# Patient Record
Sex: MALE | Race: ASIAN | Employment: PART TIME | URBAN - METROPOLITAN AREA
[De-identification: names, ages, dates, MRNs, and addresses within clinical notes are randomized per-mention and may not be internally consistent; named-entity substitution may affect disease eponyms.]

---

## 2021-07-12 ENCOUNTER — HOSPITAL ENCOUNTER (OUTPATIENT)
Dept: LAB | Age: 71
Discharge: HOME | End: 2021-07-12
Payer: MEDICARE

## 2021-07-12 DIAGNOSIS — R31.0 GROSS HEMATURIA: ICD-10-CM

## 2021-07-12 LAB
APPEARANCE UR: CLEAR
BILIRUB UR QL: NEGATIVE
COLOR UR: YELLOW
GLUCOSE UR STRIP.AUTO-MCNC: NEGATIVE MG/DL
HGB UR QL STRIP: NEGATIVE
KETONES UR QL STRIP.AUTO: NEGATIVE MG/DL
LEUKOCYTE ESTERASE UR QL STRIP.AUTO: NEGATIVE
NITRITE UR QL STRIP.AUTO: NEGATIVE
PH UR STRIP: 6.5 [PH] (ref 5–9)
PROT UR STRIP-MCNC: NEGATIVE MG/DL
SP GR UR REFRACTOMETRY: 1.02 (ref 1–1.02)
UROBILINOGEN UR QL STRIP.AUTO: 0.2 EU/DL (ref 0.2–1)

## 2021-07-12 PROCEDURE — 81003 URINALYSIS AUTO W/O SCOPE: CPT

## 2021-07-12 PROCEDURE — 88112 CYTOPATH CELL ENHANCE TECH: CPT

## 2021-07-13 NOTE — PROGRESS NOTES
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 There was some inflammation present. We will keep the follow-up plan as we discussed yesterday.  Thx, will

## 2021-07-14 LAB
NON-GYNECOLOGIC CYTOLOGY REPRT: NORMAL
SPECIMEN SOURCE: NORMAL

## 2022-01-31 ENCOUNTER — HOSPITAL ENCOUNTER (OUTPATIENT)
Dept: LAB | Age: 72
Discharge: HOME | End: 2022-01-31
Payer: MEDICARE

## 2022-01-31 DIAGNOSIS — R31.0 GROSS HEMATURIA: ICD-10-CM

## 2022-01-31 PROCEDURE — 88300 SURGICAL PATH GROSS: CPT

## 2022-01-31 PROCEDURE — 82355 CALCULUS ANALYSIS QUAL: CPT

## 2022-01-31 PROCEDURE — 81003 URINALYSIS AUTO W/O SCOPE: CPT

## 2022-08-01 ENCOUNTER — TELEPHONE (OUTPATIENT)
Dept: ONCOLOGY | Age: 72
End: 2022-08-01

## 2022-08-01 NOTE — TELEPHONE ENCOUNTER
Pt calling to find out if his CT is supposed to be with contrast. It is today at 1:30. He received call to arrive 1.5 hours early but thought it wasn't ordered this way. Please call.

## 2022-08-01 NOTE — TELEPHONE ENCOUNTER
Verified with CT tech that pt will not receive IV or oral contrast for scan today and that pt does not need to arrive until ~1:15 for this scan. Pt notified.

## 2022-08-05 ENCOUNTER — TELEPHONE (OUTPATIENT)
Dept: ONCOLOGY | Age: 72
End: 2022-08-05

## 2022-08-09 ENCOUNTER — HOSPITAL ENCOUNTER (OUTPATIENT)
Dept: CT IMAGING | Age: 72
Discharge: HOME | End: 2022-08-12

## 2022-08-09 DIAGNOSIS — R31.0 GROSS HEMATURIA: ICD-10-CM

## 2022-08-11 DIAGNOSIS — Z12.5 SCREENING PSA (PROSTATE SPECIFIC ANTIGEN): Primary | ICD-10-CM

## 2022-08-11 NOTE — PROGRESS NOTES
[address] [facility]ematology [facility] On[facility]  [address] [place] [address]  [phone]        Mr. Israel Roy is a 73 y.o. male with a h/o of gross hematuria, stones and BPH. 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 I initially met him when he was having episodes of gross hematuria. He has had no gross hematuria since I last saw him. In fact his urinalysis today is completely negative. I last saw him he was complaining of some lower urinary tract symptoms and mild ED. We discussed him taking 5 mg of Cialis daily. He filled the prescription but has not taken it consistently. He states his symptoms are not bothersome enough at this time to warrant treating them. His PSA was checked today and is 0.8. He has a history of a small nodular area on the right portion of his prostate that has been palpated previously. He passed a stone in October 2021. He had a CT of his abdomen and pelvis without contrast on 8/9/2022. It shows a punctate calcification intrarenally on the left side. There is no evidence of hydronephrosis. He also has what appears to be a simple cyst in the right kidney. 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 He has not had any episodes of gross hematuria since I last saw him. He had a urinalysis today which is negative for microscopic blood. He had his PSA checked by his primary care doctor on 1/5/2022. It was 0.63. He does complain of some increased lower urinary tract symptoms. He has occasional urgency and some post void dribbling. He has to strain to void a little bit. He describes the symptoms as slightly worse than previously. He also complains of some mild erectile dysfunction.   He states ALLERGIES:    No Known Allergies    ROS:     Review of Systems   Constitutional: Negative. Negative for chills, fatigue and fever. Respiratory: Negative. Cardiovascular: Negative. Gastrointestinal: Negative. Genitourinary: Negative. Negative for difficulty urinating, dysuria, flank pain, frequency, hematuria and urgency. Musculoskeletal: Negative. All other systems reviewed and are negative. PHYSICAL EXAMINATION    /70 (Site: Right Upper Arm, Position: Sitting, Cuff Size: Medium Adult)   Pulse 57   Temp 97.8 °F (36.6 °C) (Oral)   Resp 21   Ht 5' 3\" (1.6 m)   Wt 123 lb (55.8 kg)   SpO2 99%   BMI 21.79 kg/m²   General: well dressed, well nourished, no acute distress  Skin: no rashes  HEENT: Sclera are clear,normocephalic, atraumatic. no external lesions   Cardiovascular: Reg. Normal perfusion  Respiratory: normal respiratory effort, no JVD, no audible wheezing. Musculoskeletal: unremarkable with normal function. No embolic signs or cyanosis. Neurologic exam: intact, no focal deficits, moves all 4 extremities  Psych: normal mood and affect, alert, oriented x 3  LE:  no edema  GI: soft, nontender, no masses, no CVA tenderness  : Normal-appearing external genitalia. No penile lesions. Testicles descended without mass or abnormality. No clinically significant inguinal hernia. Rectal exam reveals a 30-anju cc gland that is nontender. There is some asymmetry just to the right of midline that may be a soft nodule. There is no induration or firmness.       LABORATORY RESULTS:  PSA: 0.8      IMAGING:      CT Results:    === 08/09/22 ===    CT ABDOMEN PELVIS RENAL STONE    - Narrative -  CT of the abdomen and pelvis without contrast.    CLINICAL INDICATION: Gross hematuria, prior history of kidney stones    PROCEDURE: Serial thin-section axial images obtained from the upper abdomen  through the proximal femurs without the administration of intravenous or oral  contrast. of this note was written by using a voice dictation software. The note has been proof read but may still contain some grammatical/other typographical errors.       [facility]

## 2022-08-15 ENCOUNTER — OFFICE VISIT (OUTPATIENT)
Dept: ONCOLOGY | Age: 72
End: 2022-08-15
Payer: COMMERCIAL

## 2022-08-15 ENCOUNTER — HOSPITAL ENCOUNTER (OUTPATIENT)
Dept: LAB | Age: 72
Discharge: HOME | End: 2022-08-18
Payer: COMMERCIAL

## 2022-08-15 VITALS
DIASTOLIC BLOOD PRESSURE: 70 MMHG | WEIGHT: 123 LBS | BODY MASS INDEX: 21.79 KG/M2 | OXYGEN SATURATION: 99 % | HEART RATE: 57 BPM | TEMPERATURE: 97.8 F | HEIGHT: 63 IN | RESPIRATION RATE: 21 BRPM | SYSTOLIC BLOOD PRESSURE: 116 MMHG

## 2022-08-15 DIAGNOSIS — R31.0 GROSS HEMATURIA: Primary | ICD-10-CM

## 2022-08-15 DIAGNOSIS — Z12.5 SCREENING PSA (PROSTATE SPECIFIC ANTIGEN): ICD-10-CM

## 2022-08-15 LAB
BILIRUBIN, URINE, POC: NEGATIVE
BLOOD URINE, POC: NORMAL
GLUCOSE URINE, POC: NEGATIVE
KETONES, URINE, POC: NEGATIVE
LEUKOCYTE ESTERASE, URINE, POC: NEGATIVE
NITRITE, URINE, POC: NORMAL
PH, URINE, POC: 7 (ref 4.6–8)
PROTEIN,URINE, POC: NEGATIVE
PSA SERPL-MCNC: 0.8 NG/ML
SPECIFIC GRAVITY, URINE, POC: 1.01 (ref 1–1.03)
URINALYSIS CLARITY, POC: CLEAR
URINALYSIS COLOR, POC: YELLOW
UROBILINOGEN, POC: 0

## 2022-08-15 PROCEDURE — 81003 URINALYSIS AUTO W/O SCOPE: CPT | Performed by: UROLOGY

## 2022-08-15 PROCEDURE — 84153 ASSAY OF PSA TOTAL: CPT

## 2022-08-15 PROCEDURE — 99213 OFFICE O/P EST LOW 20 MIN: CPT | Performed by: UROLOGY

## 2022-08-15 PROCEDURE — 1123F ACP DISCUSS/DSCN MKR DOCD: CPT | Performed by: UROLOGY

## 2022-08-15 PROCEDURE — 36415 COLL VENOUS BLD VENIPUNCTURE: CPT

## 2022-08-15 ASSESSMENT — PATIENT HEALTH QUESTIONNAIRE - PHQ9
2. FEELING DOWN, DEPRESSED OR HOPELESS: 0
SUM OF ALL RESPONSES TO PHQ QUESTIONS 1-9: 0
1. LITTLE INTEREST OR PLEASURE IN DOING THINGS: 0
SUM OF ALL RESPONSES TO PHQ9 QUESTIONS 1 & 2: 0

## 2022-08-15 ASSESSMENT — ENCOUNTER SYMPTOMS
RESPIRATORY NEGATIVE: 1
GASTROINTESTINAL NEGATIVE: 1

## 2022-08-15 NOTE — PATIENT INSTRUCTIONS
Patient Instructions from Today's Visit    Reason for Visit:  Follow up     Diagnosis Information:  [email or web address]      Plan: We are checking your psa today  We will dip your urine as well just to make sure it is clear. Follow Up: In one year with repeat labs. Recent Lab Results:  N/a    Treatment Summary has been discussed and given to patient: n/a        -------------------------------------------------------------------------------------------------------------------  Please call our office at [phone] if you have any  of the following symptoms:   Fever of 100.5 or greater  Chills  Shortness of breath  Swelling or pain in one leg    After office hours an answering service is available and will contact a provider for emergencies or if you are experiencing any of the above symptoms. Patient does express an interest in My Chart. My Chart log in information explained on the after visit summary printout at the [address] desk.     [facility]

## 2023-02-17 DIAGNOSIS — R31.0 GROSS HEMATURIA: Primary | ICD-10-CM

## 2023-02-20 ENCOUNTER — HOSPITAL ENCOUNTER (OUTPATIENT)
Dept: LAB | Age: 73
Discharge: HOME | End: 2023-02-23

## 2023-02-20 ENCOUNTER — OFFICE VISIT (OUTPATIENT)
Dept: ONCOLOGY | Age: 73
End: 2023-02-20

## 2023-02-20 VITALS
TEMPERATURE: 97.9 F | WEIGHT: 121.7 LBS | DIASTOLIC BLOOD PRESSURE: 78 MMHG | RESPIRATION RATE: 16 BRPM | BODY MASS INDEX: 21.56 KG/M2 | SYSTOLIC BLOOD PRESSURE: 129 MMHG | OXYGEN SATURATION: 100 % | HEIGHT: 63 IN | HEART RATE: 52 BPM

## 2023-02-20 DIAGNOSIS — Z12.5 SCREENING PSA (PROSTATE SPECIFIC ANTIGEN): ICD-10-CM

## 2023-02-20 DIAGNOSIS — R31.0 GROSS HEMATURIA: Primary | ICD-10-CM

## 2023-02-20 DIAGNOSIS — R97.20 ELEVATED PSA: Primary | ICD-10-CM

## 2023-02-20 ASSESSMENT — PATIENT HEALTH QUESTIONNAIRE - PHQ9
1. LITTLE INTEREST OR PLEASURE IN DOING THINGS: 0
2. FEELING DOWN, DEPRESSED OR HOPELESS: 0
SUM OF ALL RESPONSES TO PHQ QUESTIONS 1-9: 0
SUM OF ALL RESPONSES TO PHQ9 QUESTIONS 1 & 2: 0

## 2023-02-20 NOTE — PROGRESS NOTES
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 Since I last saw him he has had several episodes of gross hematuria. He states that he develops a day or 2 worth of urgency and some dysuria with possible urethral pain. He then has a few episodes of hematuria and is then passed some 3 to 5 mm calcifications. He denies ever having any flank pain or nausea or vomiting. He questions whether or not these calcifications are may be coming from his prostatic urethra. He denies any urinary tract infections. He has otherwise been feeling well. His primary care physician checked his PSA on 12/26/2023 and it was stable at 0.724. His last upper tract imaging was on 8/9/2022 and was a noncontrasted CT scan of the abdomen pelvis. It showed some punctate nonobstructing left stones. There was no hydronephrosis or hydroureter. His last urine cytology was negative for malignancy. 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 I initially met him when he was having episodes of gross hematuria. He has had no gross hematuria since I last saw him. In fact his urinalysis today is completely negative. I last saw him he was complaining of some lower urinary tract symptoms and mild ED. We discussed him taking 5 mg of Cialis daily. He filled the prescription but has not taken it consistently. He states his symptoms are not bothersome enough at this time to warrant treating them. His PSA was checked today and is 0.8. He has a history of a small nodular area on the right portion of his prostate that has been palpated previously. He passed a stone in October 2021. He had a CT of his abdomen and pelvis without contrast on 8/9/2022. It shows a punctate calcification intrarenally on the left side. There is no evidence of hydronephrosis.   He also has what appears to be a simple cyst in the right kidney. 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 He has not had any episodes of gross hematuria since I last saw him. He had a urinalysis today which is negative for microscopic blood. He had his PSA checked by his primary care doctor on 1/5/2022. It was 0.63. He does complain of some increased lower urinary tract symptoms. He has occasional urgency and some post void dribbling. He has to strain to void a little bit. He describes the symptoms as slightly worse than previously. He also complains of some mild erectile dysfunction. He states it is also worsened over the last couple years and he has some trouble achieving and maintaining an erection. In regards to his lower urinary tract symptoms he has tried Flomax in the past and has had some postural hypotension and also does not care for the retrograde ejaculation. He has never tried daily Cialis. He does not take any nitrates, and only takes metoprolol for his blood pressure. Of note he passed a 4 to 5 mm stone recently. He is unclear whether this was a bladder prostate or renal stone. He did not have any real significant flank pain. He brought the stone to us for analysis. 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 He states he has had no more episodes of gross hematuria since I last saw him. Although he filled his Avodart prescription he did not start taking it and was just going to wait and see if he began to have any bleeding. He decreased his 2 aspirin a day to just 1 a day.   He denies any dysuria or other significant lower urinary tract symptoms. His PSA was last checked in October of last year and he states it was 0.6 he has also had a known small prostate nodule for the past 2 years. His imaging and transrectal ultrasound have shown multiple calcifications in his prostate but nothing concerning for prostate cancer. -LUTS symptoms increasing. ED issues increased last 2-3 years.  -pt passed stone on 10/8/21       HISTORY OF PRESENT ILLNESS: Mr. Geraldo Barron is a 81 y.o. male with a diagnosis of [facility] with above history; patient is here today for follow-up surveillance cystoscopy. Past medical history, surgical history, medications, allergies, family history and social history were reviewed and are unchanged other than what is noted above. REVIEW OF SYSTEMS: As above. All other systems negative. ALLERGIES:  No Known Allergies    PHYSICAL EXAMINATION:  /78 (Site: Left Upper Arm, Position: Sitting, Cuff Size: Medium Adult)   Pulse 52   Temp 97.9 °F (36.6 °C) (Oral)   Resp 16   Ht 5' 3\" (1.6 m)   Wt 121 lb 11.2 oz (55.2 kg)   SpO2 100%   BMI 21.56 kg/m²   GENERAL: The patient is in no acute distress. CV:  Reg. Normal perfusion  PULMONARY: normal respiratory effort, no JVD, no audible wheezing. ABDOMEN: Soft, nontender. GENITOURINARY EXAM: Unremarkable. EXTREMITIES: Warm and well perfused. LABORATORY RESULTS:  No results found for this visit on 02/20/23. PROCEDURE NOTE: After informed consent was obtained, the patient was prepped and draped in the usual sterile fashion. A 2% lidocaine jelly was instilled in the patient's urethra. We performed a pre-procedure time out. I then inserted a flexible cystoscope and under direct vision carried it into the patient's bladder. The urethra appeared normal.  The right and left ureteral orifices appeared normal.  The entire bladder was surveyed and showed a normal-appearing bladder.   There was clear efflux of urine from both the right and left ureteral orifice. He does not have a significant median lobe of the prostate. There was no significant vascularity or edematous mucosa at the bladder neck or proximal prostatic urethra. Interestingly 1 to 2 cm above the veru were some punctate stones adherent to the prostatic urethral mucosa and one 2-3 mm stone. It was unclear whether these were possibly from the ejaculatory ducts. The scope was removed and the patient tolerated the procedure well. There were no complications. IMPRESSION AND PLAN: Mr. Marge Trivedi is a 81 y.o. male with a diagnosis of gross hematuria. He has also passed some small stones that may be coming from his prostatic urethra. It is unclear to me if these are coming from the ejaculatory duct or from somewhere else within the prostatic urethra. I have recommended we get a repeat CT scan of his abdomen pelvis with and without contrast to evaluate for upper tract stones and any potential upper tract lesions. I am encouraged that his cystoscopy otherwise looked normal today.   He will plan to see me back in 6 months assuming his upcoming CT scan is unremarkable.    -ct a/p w wo cont and delayed imaging  -psa was drawn at pcp

## 2023-02-20 NOTE — PROGRESS NOTES
Pre Cystoscopy   At risk factors reviewed:   Autoimmune diseases: no   Artificial Joints: no  Mechanical heart valve/pacemaker: no  Indwelling ureteral stent: no  Indwelling catheter: no  Elderly >80: no  Smoker: no  Oral steroid/prednisone use: no  Low weight: no  Hx of frequent UTI's: no  Prolonged hospital stay in the past 6mths (>10days): no  Diabetes: no    Provider informed of at risk factors: yes    ABX given: no

## 2023-02-28 ENCOUNTER — HOSPITAL ENCOUNTER (OUTPATIENT)
Dept: CT IMAGING | Age: 73
Discharge: HOME | End: 2023-03-03

## 2023-02-28 DIAGNOSIS — R31.0 GROSS HEMATURIA: ICD-10-CM

## 2023-08-17 NOTE — PROGRESS NOTES
[address] [place] [address] Hematology & Oncology  [place] [address] [facility]  [phone]        Mr. Adiel Rhoades is a 81 y.o. 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 Since I last saw him he has had no episodes of hematuria. He denies any significant change in his lower urinary tract symptoms. He occasionally has some dysuria but no sign of urinary tract infections. His urinalysis today shows no microscopic blood and is nitrite and leukoesterase negative. I cystoscopy to him back in February 2023 and he had some punctate stones at the prostatic urethra. 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 Of note he did have significant calcifications within his prostate and prostatic urethra on that CT scan. His PSA was last checked on 12/26/2023 and was 0.724. His previous digital rectal exam was unremarkable. 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 Since I last saw him he has had several episodes of gross hematuria. He states that he develops a day or 2 worth of urgency and some dysuria with possible urethral pain. He then has a few episodes of hematuria and is then passed some 3 to 5 mm calcifications. He denies ever having any flank pain or nausea or vomiting. He questions whether or not these calcifications are may be coming from his prostatic urethra. He denies any urinary tract infections. He has otherwise been feeling well. His primary care physician checked his PSA on 12/26/2023 and it was stable at 0.724. His last upper tract imaging was on 8/9/2022 and was a noncontrasted CT scan of the abdomen pelvis. It showed some punctate nonobstructing left stones. There was no hydronephrosis or hydroureter.   His last urine

## 2023-08-21 ENCOUNTER — HOSPITAL ENCOUNTER (OUTPATIENT)
Dept: LAB | Age: 73
Discharge: HOME | End: 2023-08-24
Payer: COMMERCIAL

## 2023-08-21 ENCOUNTER — OFFICE VISIT (OUTPATIENT)
Dept: ONCOLOGY | Age: 73
End: 2023-08-21
Payer: COMMERCIAL

## 2023-08-21 VITALS
OXYGEN SATURATION: 96 % | WEIGHT: 122.6 LBS | BODY MASS INDEX: 21.72 KG/M2 | RESPIRATION RATE: 16 BRPM | TEMPERATURE: 97.5 F | SYSTOLIC BLOOD PRESSURE: 116 MMHG | HEIGHT: 63 IN | HEART RATE: 58 BPM | DIASTOLIC BLOOD PRESSURE: 76 MMHG

## 2023-08-21 DIAGNOSIS — R31.0 GROSS HEMATURIA: ICD-10-CM

## 2023-08-21 DIAGNOSIS — R31.0 GROSS HEMATURIA: Primary | ICD-10-CM

## 2023-08-21 LAB
APPEARANCE UR: CLEAR
BACTERIA URNS QL MICRO: NORMAL /HPF
BILIRUB UR QL: NEGATIVE
COLOR UR: YELLOW
EPITH CASTS URNS QL MICRO: NORMAL /LPF
GLUCOSE UR STRIP.AUTO-MCNC: NEGATIVE MG/DL
HGB UR QL STRIP: NEGATIVE
KETONES UR QL STRIP.AUTO: NEGATIVE MG/DL
LEUKOCYTE ESTERASE UR QL STRIP.AUTO: NEGATIVE
NITRITE UR QL STRIP.AUTO: NEGATIVE
PH UR STRIP: 7 (ref 5–9)
PROT UR STRIP-MCNC: NEGATIVE MG/DL
RBC #/AREA URNS HPF: NORMAL /HPF
SP GR UR REFRACTOMETRY: 1.01 (ref 1–1.02)
URINE CULTURE IF INDICATED: NORMAL
UROBILINOGEN UR QL STRIP.AUTO: 0.2 EU/DL
WBC URNS QL MICRO: NORMAL /HPF

## 2023-08-21 PROCEDURE — 1123F ACP DISCUSS/DSCN MKR DOCD: CPT | Performed by: UROLOGY

## 2023-08-21 PROCEDURE — 81001 URINALYSIS AUTO W/SCOPE: CPT

## 2023-08-21 PROCEDURE — 99213 OFFICE O/P EST LOW 20 MIN: CPT | Performed by: UROLOGY

## 2023-08-21 RX ORDER — EZETIMIBE 10 MG/1
TABLET ORAL
COMMUNITY
Start: 2023-04-26

## 2023-08-21 RX ORDER — ROSUVASTATIN CALCIUM 10 MG/1
TABLET, COATED ORAL
COMMUNITY
Start: 2023-07-19

## 2023-08-21 RX ORDER — FAMOTIDINE 20 MG/1
TABLET, FILM COATED ORAL
COMMUNITY
Start: 2023-02-22

## 2023-08-21 ASSESSMENT — PATIENT HEALTH QUESTIONNAIRE - PHQ9
2. FEELING DOWN, DEPRESSED OR HOPELESS: 0
SUM OF ALL RESPONSES TO PHQ QUESTIONS 1-9: 0
SUM OF ALL RESPONSES TO PHQ9 QUESTIONS 1 & 2: 0
SUM OF ALL RESPONSES TO PHQ QUESTIONS 1-9: 0
SUM OF ALL RESPONSES TO PHQ QUESTIONS 1-9: 0
1. LITTLE INTEREST OR PLEASURE IN DOING THINGS: 0
SUM OF ALL RESPONSES TO PHQ QUESTIONS 1-9: 0

## 2023-08-21 ASSESSMENT — ENCOUNTER SYMPTOMS
RESPIRATORY NEGATIVE: 1
GASTROINTESTINAL NEGATIVE: 1

## 2023-08-21 NOTE — PATIENT INSTRUCTIONS
Patient Instructions from Today's Visit    Reason for Visit:  Follow up    Diagnosis Information:  [email or web address]-[email or web address]-[email or web address]      Plan:  Scan is stable. We will check a UA today. Follow Up: We will plan to see you back in 12 months with PSA and UA prior     Recent Lab Results:  N/a    Treatment Summary has been discussed and given to patient:   N/a      -------------------------------------------------------------------------------------------------------------------    Patient does express an interest in My Chart. My Chart log in information explained on the after visit summary printout at the [address] desk.     ADARSH Miller

## 2024-03-06 ENCOUNTER — HOSPITAL ENCOUNTER (OUTPATIENT)
Dept: PHYSICAL THERAPY | Age: 74
Setting detail: RECURRING SERIES
Discharge: HOME | End: 2024-03-09
Payer: COMMERCIAL

## 2024-03-06 DIAGNOSIS — R26.89 OTHER ABNORMALITIES OF GAIT AND MOBILITY: ICD-10-CM

## 2024-03-06 DIAGNOSIS — M25.551 PAIN IN RIGHT HIP: ICD-10-CM

## 2024-03-06 DIAGNOSIS — M25.572 PAIN IN LEFT ANKLE AND JOINTS OF LEFT FOOT: ICD-10-CM

## 2024-03-06 DIAGNOSIS — M54.59 OTHER LOW BACK PAIN: Primary | ICD-10-CM

## 2024-03-06 PROCEDURE — 97110 THERAPEUTIC EXERCISES: CPT

## 2024-03-06 PROCEDURE — 97162 PT EVAL MOD COMPLEX 30 MIN: CPT

## 2024-03-06 NOTE — PROGRESS NOTES
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)  Secondary:  [facility] @ [place] [address] [address] [place] [address]  Phone: [phone]  Fax: [phone] Plan Frequency: 2x/week  Plan of Care/Certification Expiration Date: 24        Plan of Care/Certification Expiration Date:  Plan of Care/Certification Expiration Date: 24    Frequency/Duration: Plan Frequency: 2x/week      Time In/Out:   Time In: 1500  Time Out: 1610      PT Visit Info:         Visit Count:  1    OUTPATIENT PHYSICAL THERAPY:   Treatment Note 3/6/2024       Episode  (LBP, R hip pain and L ankle pain)               Treatment Diagnosis:    Other low back pain  Pain in right hip  Pain in left ankle and joints of left foot  Other abnormalities of gait and mobility  Medical/Referring Diagnosis:    Low back pain [M54.50]  Left ankle pain [M25.572]    Referring Physician:  Randolph Garner MD MD Orders:  PT Eval and Treat   Return MD Appt:  TBD   Date of Onset:  Onset Date: 24 (Ankle injury. LBP has been worsening over last 5 years)     Allergies:   Patient has no known allergies.  Restrictions/Precautions:   Weight Bearing Status: WBAT      Interventions Planned (Treatment may consist of any combination of the following):     See Assessment Note    Subjective Comments:   R low back and hip pain; L ankle pain  Initial Pain Level::     3/10  Post Session Pain Level:       3/10  Medications Last Reviewed:  3/6/2024  Updated Objective Findings:  See Evaluation Note from today  Treatment   THERAPEUTIC EXERCISE: (15 minutes):    Exercises per grid below to improve mobility, strength, balance, and coordination.  Required moderate visual, verbal, manual, and tactile cues to promote proper body alignment, promote proper body posture, and promote proper body mechanics.  Progressed resistance, range, repetitions, and complexity of movement as indicated.   Date:  3/6/2024   Activity/Exercise

## 2024-03-06 NOTE — THERAPY EVALUATION
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)  Secondary:  [facility] @ [place] [address] [address] [place] [address]  Phone: [phone]  Fax: [phone] Plan Frequency: 2x/week    Plan of Care/Certification Expiration Date: 24        Plan of Care/Certification Expiration Date:  Plan of Care/Certification Expiration Date: 24    Frequency/Duration: Plan Frequency: 2x/week      Time In/Out:   Time In: 1500  Time Out: 1610      PT Visit Info:         Visit Count:  1                OUTPATIENT PHYSICAL THERAPY:             Initial Assessment 3/6/2024               Episode (LBP, R hip pain and L ankle pain)         Treatment Diagnosis:     Other low back pain  Pain in right hip  Pain in left ankle and joints of left foot  Other abnormalities of gait and mobility  Medical/Referring Diagnosis:    Low back pain [M54.50]  Left ankle pain [M25.572]    Referring Physician:  Randolph Garner MD MD Orders:  PT Eval and Treat - WBAT in boot/brace as symptoms allow  Return MD Appt:  TBD  Date of Onset:  Onset Date: 24 (Ankle injury. LBP has been worsening over last 5 years)     Allergies:  Patient has no known allergies.  Restrictions/Precautions:    Weight Bearing Status: WBAT - in boot/brace      Medications Last Reviewed:  3/6/2024     SUBJECTIVE   History of Injury/Illness (Reason for Referral):  \"Carmel" is a 74 yo male referred to physical therapy for low back and R hip pain that has worsened over the last 5 years as well as acute L ankle pain from an injury on 24. He reports his back was really bothering him a few years ago R>L and he ended up having a MRI in May of 2019 revealing \"multilevel spondylosis in the lumbar spine as well as moderate to severe right foraminal stenosis at L4-5.\" Through chiropractic work and physical therapy he says his back eventually improved over a 2 year period. A few months ago he started noticing an increase in

## 2024-03-07 ASSESSMENT — PAIN SCALES - GENERAL: PAINLEVEL_OUTOF10: 3

## 2024-03-11 ENCOUNTER — HOSPITAL ENCOUNTER (OUTPATIENT)
Dept: PHYSICAL THERAPY | Age: 74
Setting detail: RECURRING SERIES
Discharge: HOME | End: 2024-03-14
Payer: COMMERCIAL

## 2024-03-11 PROCEDURE — 97110 THERAPEUTIC EXERCISES: CPT

## 2024-03-11 PROCEDURE — 97140 MANUAL THERAPY 1/> REGIONS: CPT

## 2024-03-11 NOTE — PROGRESS NOTES
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)  Secondary:  [facility] @ [place]  [address] [place] [address]  Phone: [phone]  Fax: [phone] Plan Frequency: 2x/week  Plan of Care/Certification Expiration Date: 24        Plan of Care/Certification Expiration Date:  Plan of Care/Certification Expiration Date: 24    Frequency/Duration: Plan Frequency: 2x/week      Time In/Out:   Time In: 730  Time Out: 825      PT Visit Info:         Visit Count:  2    OUTPATIENT PHYSICAL THERAPY:   Treatment Note 3/11/2024       Episode  (LBP, R hip pain and L ankle pain)               Treatment Diagnosis:    Other low back pain  Pain in right hip  Pain in left ankle and joints of left foot  Other abnormalities of gait and mobility  Medical/Referring Diagnosis:    Low back pain [M54.50]  Left ankle pain [M25.572]    Referring Physician:  Randolph Garner MD MD Orders:  PT Eval and Treat   Return MD Appt:  TBD   Date of Onset:  Onset Date: 24 (Ankle injury. LBP has been worsening over last 5 years)     Allergies:   Patient has no known allergies.  Restrictions/Precautions:   Weight Bearing Status: WBAT      Interventions Planned (Treatment may consist of any combination of the following):     See Assessment Note    Subjective Comments:   Pt reports a little more soreness in lateral ankle over last couple of days from the exercises. He is still wearing the boot to bed because it feels more comfortable than the brace. He started doing the extra hip exercises on Saturday.  Initial Pain Level::     10  Post Session Pain Level:       3/10  Medications Last Reviewed:  3/11/2024  Updated Objective Findings:  None Today  Treatment   THERAPEUTIC EXERCISE: (23 minutes):    Exercises per grid below to improve mobility, strength, balance, and coordination.  Required moderate visual, verbal, manual, and tactile cues to promote proper body alignment, promote

## 2024-03-13 ENCOUNTER — HOSPITAL ENCOUNTER (OUTPATIENT)
Dept: PHYSICAL THERAPY | Age: 74
Setting detail: RECURRING SERIES
Discharge: HOME | End: 2024-03-16
Payer: COMMERCIAL

## 2024-03-13 PROCEDURE — 97110 THERAPEUTIC EXERCISES: CPT

## 2024-03-13 PROCEDURE — 97140 MANUAL THERAPY 1/> REGIONS: CPT

## 2024-03-13 ASSESSMENT — PAIN SCALES - GENERAL
PAINLEVEL_OUTOF10: 4
PAINLEVEL_OUTOF10: 4

## 2024-03-13 NOTE — PROGRESS NOTES
repetitions, and complexity of movement as indicated.   Date:  3/13/2024   Activity/Exercise Parameters   Supine piriformis and fig 4 stretch  3x30 sec   Seated HS stretch 3x30 sec   Seated lumbar flexion x5   Supine ankle pumps x20   Supine calf stretch with strap 3x30 sec   Supine sciatic nn glides X30 B   PPT with march 2 min   Bridge 3x10   Clams --   Sidelying hip abduction 2x10 B   SLR 2x10 B     MANUAL THERAPY: (30 minutes):   Joint mobilization and Soft tissue mobilization was utilized and necessary because of the patient's restricted joint motion and restricted motion of soft tissue.   - STM to lumbar paraspinals and rolling to R hip  - STM to lateral ankle, peroneals and medial gastroc    Treatment/Session Summary:    Treatment Assessment:   Scar tolerated treatment well today. Added a couple more hip strengthening exercises which he did well with. He fatigues quicker in L LE compared to R with sidelying hip abduction.   Communication/Consultation:  None today  Equipment provided today:  None  Recommendations/Intent for next treatment session: Next visit will focus on lumbar, hip and ankle mobility and strengthening.    >Total Treatment Billable Duration:  53 minutes of treatment   Time In: 0930  Time Out: 1030    Mario Miranda, PT         [employer]  [employer] Portal  Appt Desk     Future Appointments   Date Time Provider Department Center   3/18/2024  3:00 PM Mario Miranda, PT [facility]   3/25/2024  2:00 PM Mario Miranda, PT [facility]   3/27/2024  9:30 AM Mario Miranda, PT [facility]   4/1/2024  2:00 PM Mario Miranda, PT [facility]   4/3/2024  4:00 PM Mario Miranda, PT [facility]   8/19/2024 10:30 AM [facility]   8/19/2024 11:00 AM Mario Red MD [facility]

## 2024-03-18 ENCOUNTER — HOSPITAL ENCOUNTER (OUTPATIENT)
Dept: PHYSICAL THERAPY | Age: 74
Setting detail: RECURRING SERIES
Discharge: HOME | End: 2024-03-21
Payer: COMMERCIAL

## 2024-03-18 PROCEDURE — 97140 MANUAL THERAPY 1/> REGIONS: CPT

## 2024-03-18 PROCEDURE — 97110 THERAPEUTIC EXERCISES: CPT

## 2024-03-18 NOTE — PROGRESS NOTES
proper body alignment, promote proper body posture, and promote proper body mechanics.  Progressed resistance, range, repetitions, and complexity of movement as indicated.   Date:  3/18/2024   Activity/Exercise Parameters   Supine piriformis and fig 4 stretch  3x30 sec   Seated HS stretch 3x30 sec   Seated lumbar flexion --   Supine ankle pumps x20   Supine calf stretch with strap 3x30 sec   Supine sciatic nn glides X30 B   PPT with march 2 min   Bridge 3x10   Clams --   Prone hip extension 2x15 B   Sidelying hip abduction 2x10 B   SLR 2x10 B     MANUAL THERAPY: (30 minutes):   Joint mobilization and Soft tissue mobilization was utilized and necessary because of the patient's restricted joint motion and restricted motion of soft tissue.   - STM to lumbar paraspinals and rolling to R hip with patient prone  - Hip IR/ER with sustained pressure to posterior hip  - STM to lateral ankle, peroneals and medial gastroc    Treatment/Session Summary:    Treatment Assessment:   Scar tolerated treatment well today. Less TTP noted in R posterior hip. He will benefit from continued hip and LE strengthening as well as light CKC/balance exercises with brace on.  Communication/Consultation:  None today  Equipment provided today:  None  Recommendations/Intent for next treatment session: Next visit will focus on lumbar, hip and ankle mobility and strengthening.    >Total Treatment Billable Duration:  53 minutes of treatment   Time In: 1500  Time Out: 1600    Mario Miranda PT         Charge Capture  [employer] Portal  Appt Desk     Future Appointments   Date Time Provider Department Center   3/20/2024  8:30 AM Sandra Cesar, PT [facility]   3/25/2024  2:00 PM Mario Miranda, PT [facility]   3/27/2024  9:30 AM Mario Miranda, PT [facility]   4/1/2024  2:00 PM Mario Miranda, PT [facility]   4/3/2024  4:00 PM Mario Miranda, PT [facility]   8/19/2024 10:30 AM [facility]   8/19/2024 11:00 AM Mario Red MD

## 2024-03-19 ASSESSMENT — PAIN SCALES - GENERAL: PAINLEVEL_OUTOF10: 3

## 2024-03-20 ENCOUNTER — HOSPITAL ENCOUNTER (OUTPATIENT)
Dept: PHYSICAL THERAPY | Age: 74
Setting detail: RECURRING SERIES
Discharge: HOME | End: 2024-03-23
Payer: COMMERCIAL

## 2024-03-20 PROCEDURE — 97110 THERAPEUTIC EXERCISES: CPT

## 2024-03-20 PROCEDURE — 97140 MANUAL THERAPY 1/> REGIONS: CPT

## 2024-03-20 ASSESSMENT — PAIN SCALES - GENERAL: PAINLEVEL_OUTOF10: 3

## 2024-03-20 NOTE — PROGRESS NOTES
and promote proper body mechanics.  Progressed resistance, range, repetitions, and complexity of movement as indicated.   Date:  3/20/2024   Activity/Exercise Parameters   Supine piriformis and fig 4 stretch  3x30 sec   Seated HS stretch 3x30 sec   Seated lumbar flexion --   Supine ankle pumps x20   Supine calf stretch with strap 3x30 sec   Supine sciatic nn glides X30 B   PPT with march 2 min   Bridge 3x10   Clams --   Prone hip extension 2x15 B   Sidelying hip abduction 2x10 B   Pt edu in step to pattern up stairs properly  Completed    Stepping drill AP and PA  Emphasis on ankle propulsion, x2 min    SLR 2x10 B     MANUAL THERAPY: (15 minutes):   Joint mobilization and Soft tissue mobilization was utilized and necessary because of the patient's restricted joint motion and restricted motion of soft tissue.   - STM to lumbar paraspinals and rolling to R hip with patient R sidelying   - Hip IR/ER with sustained pressure to posterior hip  - STM to lateral ankle, peroneals and medial gastroc    Treatment/Session Summary:    Treatment Assessment:   Scar did well today, he is limited in talocrural and subtalar mobility with limited ankle propulsion control with walking. Plan to progress functional trunk, hip, and ankle stability and endurance.     Communication/Consultation:  None today  Equipment provided today:  None  Recommendations/Intent for next treatment session: Next visit will focus on lumbar, hip and ankle mobility and strengthening.    >Total Treatment Billable Duration:  53 minutes of treatment   Time In: 0836  Time Out: 0934    REY CURTIS PT         Charge Capture  [employer] Portal  Appt Desk     Future Appointments   Date Time Provider Department Center   3/25/2024  2:00 PM Mario Miranda, PT [facility]   3/27/2024  9:30 AM Mario Miranda, PT [facility]   4/1/2024  2:00 PM Mario Miranda, PT [facility]   4/3/2024  4:00 PM Mario Miranda, PT [facility]   8/19/2024 10:30 AM [facility]

## 2024-03-25 ENCOUNTER — HOSPITAL ENCOUNTER (OUTPATIENT)
Dept: PHYSICAL THERAPY | Age: 74
Setting detail: RECURRING SERIES
Discharge: HOME | End: 2024-03-28
Payer: COMMERCIAL

## 2024-03-25 PROCEDURE — 97140 MANUAL THERAPY 1/> REGIONS: CPT

## 2024-03-25 PROCEDURE — 97110 THERAPEUTIC EXERCISES: CPT

## 2024-03-25 PROCEDURE — 97112 NEUROMUSCULAR REEDUCATION: CPT

## 2024-03-25 NOTE — PROGRESS NOTES
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)  Secondary:  [facility] @ [place] [address] [address] [place] [address]  Phone: [phone]  Fax: [phone] Plan Frequency: 2x/week  Plan of Care/Certification Expiration Date: 24        Plan of Care/Certification Expiration Date:  Plan of Care/Certification Expiration Date: 24    Frequency/Duration: Plan Frequency: 2x/week      Time In/Out:   Time In: 1400  Time Out: 1500      PT Visit Info:         Visit Count:  6    OUTPATIENT PHYSICAL THERAPY:   Treatment Note 3/25/2024       Episode  (LBP, R hip pain and L ankle pain)               Treatment Diagnosis:    Other low back pain  Pain in right hip  Pain in left ankle and joints of left foot  Other abnormalities of gait and mobility  Medical/Referring Diagnosis:    Low back pain [M54.50]  Left ankle pain [M25.572]    Referring Physician:  Randolph Garner MD MD Orders:  PT Eval and Treat   Return MD Appt:  TBD   Date of Onset:  Onset Date: 24 (Ankle injury. LBP has been worsening over last 5 years)     Allergies:   Patient has no known allergies.  Restrictions/Precautions:   Weight Bearing Status: WBAT      Interventions Planned (Treatment may consist of any combination of the following):     See Assessment Note    Subjective Comments:   Tony states he is doing pretty well, therapy seems to be helping his back pain. He is doing his best to be out of the boot and only in his brace. Today his biggest concerns are of his ability to navigate stairs.     Initial Pain Level::      /10  Post Session Pain Level:        /10  Medications Last Reviewed:  3/25/2024  Updated Objective Findings:  None Today  Treatment   THERAPEUTIC EXERCISE: (23 minutes):    Exercises per grid below to improve mobility, strength, balance, and coordination.  Required moderate visual, verbal, manual, and tactile cues to promote proper body alignment, promote proper body posture,

## 2024-03-27 ENCOUNTER — HOSPITAL ENCOUNTER (OUTPATIENT)
Dept: PHYSICAL THERAPY | Age: 74
Setting detail: RECURRING SERIES
Discharge: HOME | End: 2024-03-30
Payer: COMMERCIAL

## 2024-03-27 PROCEDURE — 97140 MANUAL THERAPY 1/> REGIONS: CPT

## 2024-03-27 PROCEDURE — 97112 NEUROMUSCULAR REEDUCATION: CPT

## 2024-03-27 PROCEDURE — 97110 THERAPEUTIC EXERCISES: CPT

## 2024-03-27 ASSESSMENT — PAIN SCALES - GENERAL: PAINLEVEL_OUTOF10: 2

## 2024-03-27 NOTE — PROGRESS NOTES
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)  Secondary:  [facility] @ [place] [address] [place] [address]  Phone: [phone]  Fax: [phone] Plan Frequency: 2x/week  Plan of Care/Certification Expiration Date: 24        Plan of Care/Certification Expiration Date:  Plan of Care/Certification Expiration Date: 24    Frequency/Duration: Plan Frequency: 2x/week      Time In/Out:   Time In: 930  Time Out: 1030      PT Visit Info:         Visit Count:  7    OUTPATIENT PHYSICAL THERAPY:   Treatment Note 3/27/2024       Episode  (LBP, R hip pain and L ankle pain)               Treatment Diagnosis:    Other low back pain  Pain in right hip  Pain in left ankle and joints of left foot  Other abnormalities of gait and mobility  Medical/Referring Diagnosis:    Low back pain [M54.50]  Left ankle pain [M25.572]    Referring Physician:  Randolph Garner MD MD Orders:  PT Eval and Treat   Return MD Appt:  TBD   Date of Onset:  Onset Date: 24 (Ankle injury. LBP has been worsening over last 5 years)     Allergies:   Patient has no known allergies.  Restrictions/Precautions:   Weight Bearing Status: WBAT      Interventions Planned (Treatment may consist of any combination of the following):     See Assessment Note    Subjective Comments:   Tony reports no increase in pain with new balance/CKC exercises last visit. He continues to improve. He works on climbing stairs about 3 times a day and is slow with his movement     Initial Pain Level::     2/10  Post Session Pain Level:       2/10  Medications Last Reviewed:  3/27/2024  Updated Objective Findings:  None Today  Treatment   THERAPEUTIC EXERCISE: (23 minutes):    Exercises per grid below to improve mobility, strength, balance, and coordination.  Required moderate visual, verbal, manual, and tactile cues to promote proper body alignment, promote proper body posture, and promote proper body mechanics.

## 2024-04-01 ENCOUNTER — HOSPITAL ENCOUNTER (OUTPATIENT)
Dept: PHYSICAL THERAPY | Age: 74
Setting detail: RECURRING SERIES
Discharge: HOME | End: 2024-04-04
Payer: COMMERCIAL

## 2024-04-01 PROCEDURE — 97140 MANUAL THERAPY 1/> REGIONS: CPT

## 2024-04-01 PROCEDURE — 97112 NEUROMUSCULAR REEDUCATION: CPT

## 2024-04-01 PROCEDURE — 97110 THERAPEUTIC EXERCISES: CPT

## 2024-04-01 ASSESSMENT — PAIN SCALES - GENERAL: PAINLEVEL_OUTOF10: 2

## 2024-04-01 NOTE — PROGRESS NOTES
repetitions, and complexity of movement as indicated.   Date:  4/1/2024   Activity/Exercise Parameters   Supine piriformis stretch  3x30 sec   Seated HS stretch 3x30 sec   Seated lumbar flexion --   Supine ankle pumps --   Seated calf stretch with strap --   Supine sciatic nn glides --   PPT with march - level 2 2xfatigue   Bridge x10   SL bridge 2x10 B   Prone alt UE/LE lifts 2x8   Crunch 2xfatigue   Clams --   Prone hip extension --   Sidelying hip abduction 2x10 B   Pt edu in step to pattern up stairs properly  --   Stepping drill AP and PA  --   SLR with core activation --   Standing calf raises x15     NEUROMUSCULAR RE-EDUCATION: (15 minutes):    Exercise/activities per grid below to improve balance and proprioception.  Required minimal visual and verbal cues to promote static and dynamic balance in standing.   Date:  4/1/2024   Activity/Exercise Parameters   Asia stepping for gait --   Semi-tandem balance with chops/lifts with red ball --   Slow march 1 lap   Side steps  1 lap   Sit to stand with yellow ball push overhead on second set --   Tandem walk 1 lap   SL balance with 3 way tap 3 min       MANUAL THERAPY: (15 minutes):   Joint mobilization and Soft tissue mobilization was utilized and necessary because of the patient's restricted joint motion and restricted motion of soft tissue.   - STM to lumbar paraspinals and rolling to R hip with patient R sidelying   - Hip IR/ER with sustained pressure to posterior hip  - STM to lateral ankle, peroneals and medial gastroc  - Talocrural jt distraction  - Subtalar jt AP mobilizations - gr II    Treatment/Session Summary:    Treatment Assessment:   Scar did well with exercises today. He continues to have some soreness at lateral ankle with end range ankle dorsiflexion. His dynamic stability on L LE is progressing. Core and back strengthening exercises were progressed today and added to HEP    Communication/Consultation:  None today  Equipment provided today:

## 2024-04-03 ENCOUNTER — HOSPITAL ENCOUNTER (OUTPATIENT)
Dept: PHYSICAL THERAPY | Age: 74
Setting detail: RECURRING SERIES
End: 2024-04-03
Payer: COMMERCIAL

## 2024-04-03 NOTE — PROGRESS NOTES
Rene Jin  : 1950  Primary: [employer]  Secondary:  [facility] @ [place] [address] [address]  [facility] [address]  Phone: [phone]  Fax: [phone]       4/3/2024      Patient canceled today's appointment due to having to work and will plan to follow up at next scheduled visit.       Mario Miranda, PT

## 2024-04-22 ENCOUNTER — HOSPITAL ENCOUNTER (OUTPATIENT)
Dept: PHYSICAL THERAPY | Age: 74
Setting detail: RECURRING SERIES
Discharge: HOME | End: 2024-04-25
Payer: COMMERCIAL

## 2024-04-22 PROCEDURE — 97110 THERAPEUTIC EXERCISES: CPT

## 2024-04-22 PROCEDURE — 97112 NEUROMUSCULAR REEDUCATION: CPT

## 2024-04-22 PROCEDURE — 97140 MANUAL THERAPY 1/> REGIONS: CPT

## 2024-04-22 ASSESSMENT — PAIN SCALES - GENERAL: PAINLEVEL_OUTOF10: 2

## 2024-04-22 NOTE — PROGRESS NOTES
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)  Secondary:  [facility] [place]  [address] [place] [address]  Phone: [phone]  Fax: [phone] Plan Frequency: 2x/week  Plan of Care/Certification Expiration Date: 24        Plan of Care/Certification Expiration Date:  Plan of Care/Certification Expiration Date: 24    Frequency/Duration: Plan Frequency: 2x/week      Time In/Out:   Time In: 930  Time Out: 1030      PT Visit Info:         Visit Count:  9    OUTPATIENT PHYSICAL THERAPY:   Treatment Note 2024       Episode  (LBP, R hip pain and L ankle pain)               Treatment Diagnosis:    Other low back pain  Pain in right hip  Pain in left ankle and joints of left foot  Other abnormalities of gait and mobility  Medical/Referring Diagnosis:    Low back pain [M54.50]  Left ankle pain [M25.572]    Referring Physician:  Randolph Garner MD MD Orders:  PT Eval and Treat   Return MD Appt:  TBD   Date of Onset:  Onset Date: 24 (Ankle injury. LBP has been worsening over last 5 years)     Allergies:   Patient has no known allergies.  Restrictions/Precautions:   Weight Bearing Status: WBAT      Interventions Planned (Treatment may consist of any combination of the following):     See Assessment Note    Subjective Comments:   Tony just got back from his trip to [facility] and said that overall his ankle did well and didn't bother him much. He wore his brace while he was walking around and was able to fully participate in all activities. He reported his low back was sore a couple of days. He still notices some discomfort along lateral malleolus and sometimes around his achilles.     Initial Pain Level::     2/10  Post Session Pain Level:       2/10  Medications Last Reviewed:  2024  Updated Objective Findings:  None Today  Treatment   THERAPEUTIC EXERCISE: (23 minutes):    Exercises per grid below to improve mobility, strength, balance,

## 2024-04-29 ENCOUNTER — APPOINTMENT (OUTPATIENT)
Dept: PHYSICAL THERAPY | Age: 74
End: 2024-04-29
Payer: COMMERCIAL

## 2024-05-01 ENCOUNTER — HOSPITAL ENCOUNTER (OUTPATIENT)
Dept: PHYSICAL THERAPY | Age: 74
Setting detail: RECURRING SERIES
Discharge: HOME | End: 2024-05-04
Payer: COMMERCIAL

## 2024-05-01 PROCEDURE — 97112 NEUROMUSCULAR REEDUCATION: CPT

## 2024-05-01 PROCEDURE — 97110 THERAPEUTIC EXERCISES: CPT

## 2024-05-01 PROCEDURE — 97140 MANUAL THERAPY 1/> REGIONS: CPT

## 2024-05-01 ASSESSMENT — PAIN SCALES - GENERAL: PAINLEVEL_OUTOF10: 3

## 2024-05-01 NOTE — THERAPY RECERTIFICATION
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)  Secondary:  [facility] @ [place] [address] [place] [address]  Phone: [phone]  Fax: [phone] Plan Frequency: 1x/week    Plan of Care/Certification Expiration Date: 24        Plan of Care/Certification Expiration Date:  Plan of Care/Certification Expiration Date: 24    Frequency/Duration: Plan Frequency: 1x/week      Time In/Out:   Time In: 35  Time Out: 1030      PT Visit Info:         Visit Count:  10                OUTPATIENT PHYSICAL THERAPY:             Recertification 2024               Episode (LBP, R hip pain and L ankle pain)         Treatment Diagnosis:     Other low back pain  Pain in right hip  Pain in left ankle and joints of left foot  Other abnormalities of gait and mobility  Medical/Referring Diagnosis:    Low back pain [M54.50]  Left ankle pain [M25.572]    Referring Physician:  Randolph Garner MD MD Orders:  PT Eval and Treat - WBAT in boot/brace as symptoms allow  Return MD Appt:  TBD  Date of Onset:  Onset Date: 24 (Ankle injury. LBP has been worsening over last 5 years)     Allergies:  Patient has no known allergies.  Restrictions/Precautions:    Weight Bearing Status: WBAT - in boot/brace      Medications Last Reviewed:  2024     SUBJECTIVE   History of Injury/Illness (Reason for Referral):  \"Carmel" is a 72 yo male referred to physical therapy for low back and R hip pain that has worsened over the last 5 years as well as acute L ankle pain from an injury on 24. He reports his back was really bothering him a few years ago R>L and he ended up having a MRI in May of 2019 revealing \"multilevel spondylosis in the lumbar spine as well as moderate to severe right foraminal stenosis at L4-5.\" Through chiropractic work and physical therapy he says his back eventually improved over a 2 year period. A few months ago he started noticing an increase in LB

## 2024-05-01 NOTE — PROGRESS NOTES
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)  Secondary:  [facility] @ [place] [address] [place] [address]  Phone: [phone]  Fax: [phone] Plan Frequency: 1x/week  Plan of Care/Certification Expiration Date: 24        Plan of Care/Certification Expiration Date:  Plan of Care/Certification Expiration Date: 24    Frequency/Duration: Plan Frequency: 1x/week      Time In/Out:   Time In: 35  Time Out: 1030      PT Visit Info:         Visit Count:  10    OUTPATIENT PHYSICAL THERAPY:   Treatment Note 2024       Episode  (LBP, R hip pain and L ankle pain)               Treatment Diagnosis:    Other low back pain  Pain in right hip  Pain in left ankle and joints of left foot  Other abnormalities of gait and mobility  Medical/Referring Diagnosis:    Low back pain [M54.50]  Left ankle pain [M25.572]    Referring Physician:  Randolph Garner MD MD Orders:  PT Eval and Treat   Return MD Appt:  TBD   Date of Onset:  Onset Date: 24 (Ankle injury. LBP has been worsening over last 5 years)     Allergies:   Patient has no known allergies.  Restrictions/Precautions:   Weight Bearing Status: WBAT      Interventions Planned (Treatment may consist of any combination of the following):     See Assessment Note    Subjective Comments:   Tony reinjured his L foot this past  (see recert for full details). He is wearing his brace and boot again    Initial Pain Level::     3/10  Post Session Pain Level:       3/10  Medications Last Reviewed:  2024  Updated Objective Findings:  See Recertification Note from today  Treatment   THERAPEUTIC EXERCISE: (15 minutes):    Exercises per grid below to improve mobility, strength, balance, and coordination.  Required moderate visual, verbal, manual, and tactile cues to promote proper body alignment, promote proper body posture, and promote proper body mechanics.  Progressed resistance, range,

## 2024-05-06 ENCOUNTER — HOSPITAL ENCOUNTER (OUTPATIENT)
Dept: PHYSICAL THERAPY | Age: 74
Setting detail: RECURRING SERIES
Discharge: HOME | End: 2024-05-09
Payer: COMMERCIAL

## 2024-05-06 PROCEDURE — 97110 THERAPEUTIC EXERCISES: CPT

## 2024-05-06 PROCEDURE — 97140 MANUAL THERAPY 1/> REGIONS: CPT

## 2024-05-06 NOTE — PROGRESS NOTES
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)  Secondary:  [facility] @ [place] [address] [address] [place] [address]  Phone: [phone]  Fax: [phone] Plan Frequency: 1x/week  Plan of Care/Certification Expiration Date: 24        Plan of Care/Certification Expiration Date:  Plan of Care/Certification Expiration Date: 24    Frequency/Duration: Plan Frequency: 1x/week      Time In/Out:   Time In: 1600  Time Out: 1700      PT Visit Info:    Progress Note Counter: 1      Visit Count:  11    OUTPATIENT PHYSICAL THERAPY:   Treatment Note 2024       Episode  (LBP, R hip pain and L ankle pain)               Treatment Diagnosis:    Other low back pain  Pain in right hip  Pain in left ankle and joints of left foot  Other abnormalities of gait and mobility  Medical/Referring Diagnosis:    Low back pain [M54.50]  Left ankle pain [M25.572]    Referring Physician:  Randolph Garner MD MD Orders:  PT Eval and Treat   Return MD Appt:  TBD   Date of Onset:  Onset Date: 24 (Ankle injury. LBP has been worsening over last 5 years)     Allergies:   Patient has no known allergies.  Restrictions/Precautions:   Weight Bearing Status: WBAT      Interventions Planned (Treatment may consist of any combination of the following):     See Assessment Note    Subjective Comments:   Tony reports that his foot is getting better. Yesterday he decided to stop wearing the boot but continues to wear the ankle brace. He reports intermittent sharp pains across mid foot when walking. He woke up this morning with an increase in R lower back/buttocks pain. It improved some after his shower and stretches    Initial Pain Level::     3/10  Post Session Pain Level:       2/10  Medications Last Reviewed:  2024  Updated Objective Findings:  None Today  Treatment   THERAPEUTIC EXERCISE: (30 minutes):    Exercises per grid below to improve mobility, strength, balance, and

## 2024-05-07 ASSESSMENT — PAIN SCALES - GENERAL: PAINLEVEL_OUTOF10: 3

## 2024-05-13 ENCOUNTER — HOSPITAL ENCOUNTER (OUTPATIENT)
Dept: PHYSICAL THERAPY | Age: 74
Setting detail: RECURRING SERIES
Discharge: HOME | End: 2024-05-16
Payer: COMMERCIAL

## 2024-05-13 PROCEDURE — 97112 NEUROMUSCULAR REEDUCATION: CPT

## 2024-05-13 PROCEDURE — 97140 MANUAL THERAPY 1/> REGIONS: CPT

## 2024-05-13 PROCEDURE — 97110 THERAPEUTIC EXERCISES: CPT

## 2024-05-13 NOTE — PROGRESS NOTES
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)  Secondary:  [facility] @ [place] [address] [place] [address]  Phone: [phone]  Fax: [phone] Plan Frequency: 1x/week  Plan of Care/Certification Expiration Date: 24        Plan of Care/Certification Expiration Date:  Plan of Care/Certification Expiration Date: 24    Frequency/Duration: Plan Frequency: 1x/week      Time In/Out:   Time In: 1030  Time Out: 1130      PT Visit Info:    Progress Note Counter: 2      Visit Count:  12    OUTPATIENT PHYSICAL THERAPY:   Treatment Note 2024       Episode  (LBP, R hip pain and L ankle pain)               Treatment Diagnosis:    Other low back pain  Pain in right hip  Pain in left ankle and joints of left foot  Other abnormalities of gait and mobility  Medical/Referring Diagnosis:    Low back pain [M54.50]  Left ankle pain [M25.572]    Referring Physician:  Randolph Garner MD MD Orders:  PT Eval and Treat   Return MD Appt:  TBD   Date of Onset:  Onset Date: 24 (Ankle injury. LBP has been worsening over last 5 years)     Allergies:   Patient has no known allergies.  Restrictions/Precautions:   Weight Bearing Status: WBAT      Interventions Planned (Treatment may consist of any combination of the following):     See Assessment Note    Subjective Comments:   Tony reports that he has good and bad days. This past Saturday he had a lot of pain across mid foot and along achilles tendon that made it hard to weightbear and walk. He's not sure why other than he was in the hospital for the 2 days before and was not as active. Today he is feeling better and feels like he can do more. He will f/u with MD tomorrow    Initial Pain Level::      /10  Post Session Pain Level:        /10  Medications Last Reviewed:  2024  Updated Objective Findings:  None Today  Treatment   THERAPEUTIC EXERCISE: (15 minutes):    Exercises per grid below to improve

## 2024-07-09 ENCOUNTER — TELEPHONE (OUTPATIENT)
Dept: ONCOLOGY | Age: 74
End: 2024-07-09

## 2024-08-21 DIAGNOSIS — R31.0 GROSS HEMATURIA: Primary | ICD-10-CM

## 2024-08-21 NOTE — PROGRESS NOTES
Urologic Oncology  [facility]  [address] [place] [address]  [phone]        Mr. Rene GERMAIN Davin is a 73 y.o. male with a diagnosis of GH.    INTERVAL HISTORY: Patient is here today for follow-up.  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  I originally met him when he had some episodes of gross hematuria.  He had a full hematuria evaluation back in early 2023 that was negative.  He did have some small prostatic urethral stones.    He has no complaints today.  He denies any episodes of gross hematuria this past year.    He has had some other health events.  He twisted and broke his left ankle.  After that he was diagnosed with osteoporosis and is set to meet with an endocrinologist tomorrow to consider starting Prolia.  He also had an episode of severe hypertension and headache and was ruled out for a CVA or MI.  He had a stress test several weeks ago that was normal and did not necessitate a cardiac catheterization.    He has no significant lower urinary tract symptoms.  He was taken Cialis for BPH but has since stopped that.    His PSA on 12/28/2022 was 0.72.    His urinalysis today shows trace blood but is otherwise negative.  PSA and microscopic evaluation are pending.      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  Since I last saw him he has had no episodes of hematuria.  He denies any significant change in his lower urinary tract symptoms.  He occasionally has some dysuria but no sign of urinary tract infections.  His urinalysis today shows no microscopic blood and is nitrite and leukoesterase negative.     I cystoscopy to him back in February 2023 and he had some punctate stones at the prostatic urethra.  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  Of note he  [facility]

## 2024-08-26 ENCOUNTER — HOSPITAL ENCOUNTER (OUTPATIENT)
Dept: LAB | Age: 74
Discharge: HOME | End: 2024-08-29
Payer: COMMERCIAL

## 2024-08-26 ENCOUNTER — OFFICE VISIT (OUTPATIENT)
Dept: ONCOLOGY | Age: 74
End: 2024-08-26
Payer: COMMERCIAL

## 2024-08-26 VITALS — WEIGHT: 125.3 LBS | BODY MASS INDEX: 22.2 KG/M2 | HEIGHT: 63 IN

## 2024-08-26 DIAGNOSIS — R31.0 GROSS HEMATURIA: Primary | ICD-10-CM

## 2024-08-26 DIAGNOSIS — R31.0 GROSS HEMATURIA: ICD-10-CM

## 2024-08-26 LAB
APPEARANCE UR: CLEAR
BACTERIA URNS QL MICRO: 0 /HPF
BILIRUB UR QL: NEGATIVE
COLOR UR: YELLOW
EPI CELLS #/AREA URNS HPF: 0 /HPF
GLUCOSE UR STRIP.AUTO-MCNC: NEGATIVE MG/DL
HGB UR QL STRIP: ABNORMAL
KETONES UR QL STRIP.AUTO: NEGATIVE MG/DL
LEUKOCYTE ESTERASE UR QL STRIP.AUTO: NEGATIVE
MUCOUS THREADS URNS QL MICRO: 0 /LPF
NITRITE UR QL STRIP.AUTO: NEGATIVE
PH UR STRIP: 6.5 (ref 5–9)
PROT UR STRIP-MCNC: NEGATIVE MG/DL
PSA SERPL-MCNC: 0.8 NG/ML (ref 0–4)
RBC #/AREA URNS HPF: NORMAL /HPF
SP GR UR REFRACTOMETRY: 1.01 (ref 1–1.02)
UROBILINOGEN UR QL STRIP.AUTO: 0.2 EU/DL
WBC URNS QL MICRO: NORMAL /HPF

## 2024-08-26 PROCEDURE — 84153 ASSAY OF PSA TOTAL: CPT

## 2024-08-26 PROCEDURE — 81001 URINALYSIS AUTO W/SCOPE: CPT

## 2024-08-26 PROCEDURE — 1123F ACP DISCUSS/DSCN MKR DOCD: CPT | Performed by: UROLOGY

## 2024-08-26 PROCEDURE — 99213 OFFICE O/P EST LOW 20 MIN: CPT | Performed by: UROLOGY

## 2024-08-26 PROCEDURE — 36415 COLL VENOUS BLD VENIPUNCTURE: CPT

## 2024-08-26 RX ORDER — SODIUM FLUORIDE 5 MG/G
PASTE, DENTIFRICE ORAL
COMMUNITY
Start: 2021-05-14

## 2024-08-26 RX ORDER — METOPROLOL SUCCINATE 50 MG/1
50 TABLET, EXTENDED RELEASE ORAL DAILY
COMMUNITY
Start: 2024-07-24

## 2024-08-26 RX ORDER — AMPICILLIN TRIHYDRATE 250 MG
CAPSULE ORAL
COMMUNITY

## 2024-08-26 ASSESSMENT — PATIENT HEALTH QUESTIONNAIRE - PHQ9
1. LITTLE INTEREST OR PLEASURE IN DOING THINGS: NOT AT ALL
SUM OF ALL RESPONSES TO PHQ QUESTIONS 1-9: 0

## 2024-08-26 NOTE — PATIENT INSTRUCTIONS
Patient Information from Today's Visit    The members of your [facility] are listed below:    Physician Provider: Mario Red, Urologic Oncologist  Advanced Practice Clinician: ALYSSA Carrington  Nurse Navigator: Lidia BELTRAN RN  Medical Assistant: Evelyn DIXON CMA  :Alyssa DEWITT  Supportive Care Services: Valentin SPARKS LMSW    Diagnosis: Gross Hematuria    Follow Up Instructions:   Your labs from today are still pending  JONATHAN completed  We will plan to see you back in  1 year. If you need anything prior to your next appointment please do not hesitate to call our office.    25-30cc  Treatment Summary has been discussed and given to patient:N/A      Current Labs:   Hospital Outpatient Visit on 08/26/2024   Component Date Value Ref Range Status    Color, UA 08/26/2024 YELLOW    Final    Appearance 08/26/2024 CLEAR    Final    Specific Gravity, UA 08/26/2024 1.015  1.001 - 1.023   Final    pH, Urine 08/26/2024 6.5  5.0 - 9.0   Final    Protein, UA 08/26/2024 Negative  NEG mg/dL Final    Glucose, Ur 08/26/2024 Negative  mg/dL Final    Ketones, Urine 08/26/2024 Negative  mg/dL Final    Bilirubin, Urine 08/26/2024 Negative  NEG   Final    Blood, Urine 08/26/2024 Trace Intact (A)  NEG   Final    Urobilinogen, Urine 08/26/2024 0.2  EU/dL Final    Nitrite, Urine 08/26/2024 Negative    Final    Leukocyte Esterase, Urine 08/26/2024 Negative    Final               Please refer to After Visit Summary or [facility] for upcoming appointment information. Please call our office for rescheduling needs at least 24 hours before your scheduled appointment time.If you have any questions regarding your upcoming schedule please reach out to your care team through [employer] or call [phone].     Please notify your assigned Nurse Navigator of any unplanned hospital admissions or Emergency Room visits within 24 hours of  discharge.    -------------------------------------------------------------------------------------------------------------------  Please call our office at [phone] if you have any  of the following symptoms:   Fever of 100.5 or greater  Chills  Shortness of breath  Swelling or pain in one leg    After office hours an answering service is available and will contact a provider for emergencies or if you are experiencing any of the above symptoms.        Evelyn Zheng MA

## 2025-08-11 ENCOUNTER — TELEPHONE (OUTPATIENT)
Dept: ONCOLOGY | Age: 75
End: 2025-08-11

## 2025-08-12 DIAGNOSIS — N30.01 ACUTE CYSTITIS WITH HEMATURIA: Primary | ICD-10-CM

## 2025-08-26 ENCOUNTER — HOSPITAL ENCOUNTER (OUTPATIENT)
Dept: LAB | Age: 75
Discharge: HOME | End: 2025-08-26
Payer: COMMERCIAL

## 2025-08-26 DIAGNOSIS — R31.0 GROSS HEMATURIA: ICD-10-CM

## 2025-08-26 LAB
APPEARANCE UR: CLEAR
BACTERIA URNS QL MICRO: 0 /HPF
BILIRUB UR QL: NEGATIVE
CASTS URNS QL MICRO: NORMAL /LPF
COLOR UR: YELLOW
CRYSTALS URNS QL MICRO: NORMAL /LPF
EPI CELLS #/AREA URNS HPF: NORMAL /HPF
GLUCOSE UR STRIP.AUTO-MCNC: NEGATIVE MG/DL
HGB UR QL STRIP: ABNORMAL
KETONES UR QL STRIP.AUTO: ABNORMAL MG/DL
LEUKOCYTE ESTERASE UR QL STRIP.AUTO: ABNORMAL
MUCOUS THREADS URNS QL MICRO: 0 /LPF
NITRITE UR QL STRIP.AUTO: NEGATIVE
PH UR STRIP: 5.5 (ref 5–9)
PROT UR STRIP-MCNC: NEGATIVE MG/DL
PSA SERPL-MCNC: 3.2 NG/ML (ref 0–4)
RBC #/AREA URNS HPF: NORMAL /HPF
SP GR UR REFRACTOMETRY: 1.02 (ref 1–1.02)
UROBILINOGEN UR QL STRIP.AUTO: 0.2 EU/DL (ref 0.2–1)
WBC URNS QL MICRO: NORMAL /HPF

## 2025-08-26 PROCEDURE — 36415 COLL VENOUS BLD VENIPUNCTURE: CPT

## 2025-08-26 PROCEDURE — 84153 ASSAY OF PSA TOTAL: CPT

## 2025-08-26 PROCEDURE — 81001 URINALYSIS AUTO W/SCOPE: CPT

## 2025-09-02 ENCOUNTER — TELEPHONE (OUTPATIENT)
Dept: ONCOLOGY | Age: 75
End: 2025-09-02